# Patient Record
Sex: FEMALE | Race: OTHER | HISPANIC OR LATINO
[De-identification: names, ages, dates, MRNs, and addresses within clinical notes are randomized per-mention and may not be internally consistent; named-entity substitution may affect disease eponyms.]

---

## 2022-01-28 PROBLEM — Z00.00 ENCOUNTER FOR PREVENTIVE HEALTH EXAMINATION: Status: ACTIVE | Noted: 2022-01-28

## 2022-02-09 ENCOUNTER — LABORATORY RESULT (OUTPATIENT)
Age: 32
End: 2022-02-09

## 2022-02-09 ENCOUNTER — APPOINTMENT (OUTPATIENT)
Dept: GASTROENTEROLOGY | Facility: CLINIC | Age: 32
End: 2022-02-09
Payer: COMMERCIAL

## 2022-02-09 VITALS
TEMPERATURE: 97.7 F | BODY MASS INDEX: 29.99 KG/M2 | HEIGHT: 65 IN | HEART RATE: 89 BPM | DIASTOLIC BLOOD PRESSURE: 80 MMHG | WEIGHT: 180 LBS | SYSTOLIC BLOOD PRESSURE: 130 MMHG | OXYGEN SATURATION: 99 %

## 2022-02-09 DIAGNOSIS — Z82.49 FAMILY HISTORY OF ISCHEMIC HEART DISEASE AND OTHER DISEASES OF THE CIRCULATORY SYSTEM: ICD-10-CM

## 2022-02-09 DIAGNOSIS — Z78.9 OTHER SPECIFIED HEALTH STATUS: ICD-10-CM

## 2022-02-09 DIAGNOSIS — Z86.39 PERSONAL HISTORY OF OTHER ENDOCRINE, NUTRITIONAL AND METABOLIC DISEASE: ICD-10-CM

## 2022-02-09 PROCEDURE — 99204 OFFICE O/P NEW MOD 45 MIN: CPT

## 2022-02-09 RX ORDER — PANTOPRAZOLE 40 MG/1
40 TABLET, DELAYED RELEASE ORAL DAILY
Qty: 30 | Refills: 3 | Status: ACTIVE | COMMUNITY
Start: 2022-02-09 | End: 1900-01-01

## 2022-02-09 RX ORDER — ICOSAPENT ETHYL 1000 MG/1
1 CAPSULE ORAL
Refills: 0 | Status: ACTIVE | COMMUNITY

## 2022-02-09 NOTE — HISTORY OF PRESENT ILLNESS
[None] : had no significant interval events [Vomiting] : resolved vomiting [Diarrhea] : denies diarrhea [Constipation] : denies constipation [Yellow Skin Or Eyes (Jaundice)] : denies jaundice [Rectal Pain] : denies rectal pain [Heartburn] : heartburn [Nausea] : nausea [Abdominal Pain] : abdominal pain [Abdominal Swelling] : abdominal swelling [GERD] : gastroesophageal reflux disease [Wt Gain ___ Lbs] : no recent weight gain [Wt Loss ___ Lbs] : no recent weight loss [Hiatus Hernia] : no hiatus hernia [Peptic Ulcer Disease] : no peptic ulcer disease [Pancreatitis] : no pancreatitis [Cholelithiasis] : no cholelithiasis [Kidney Stone] : no kidney stone [Inflammatory Bowel Disease] : no inflammatory bowel disease [Irritable Bowel Syndrome] : no irritable bowel syndrome [Diverticulitis] : no diverticulitis [Alcohol Abuse] : no alcohol abuse [Malignancy] : no malignancy [Abdominal Surgery] : no abdominal surgery [Appendectomy] : no appendectomy [Cholecystectomy] : no cholecystectomy [de-identified] : The patient is a 32-year-old  with past medical history significant for hypercholesterolemia who was referred to my office by Dr. Elizabeth Reynaga for abdominal pain, dyspepsia, gastroesophageal reflux disease and occasional nausea/vomiting. The patient also admits to having liver issues on recent bloodwork. I was asked to render an opinion for consultation for the above complaints.   The patient states that she is feeling uncomfortable x 7 months.  The patient has a history of liver disease.  The patient denies any prior exposure to hepatitis A, B or C.  The patient denies any large doses of nonsteroidal anti-inflammatory drugs or acetaminophen.   The patient denies sharing needles, razors, nail clippers, nail files, scissors, et cetera.  The patient denies any EtOH abuse, cocaine use or intravenous drug use.   The patient denies any prior blood transfusions, sexual indiscretions, tattoos or piercing.  The patient admits to having prior surgery.  The history of surgery is significant for a prior .  The patient denies any family history of GI or liver problems. The patient complains of abdominal pain.  The patient describes the abdominal pain as a burning, intermittent midepigastric discomfort that is nonradiating in nature.  The abdominal pain is worse with stress.  The abdominal pain improves and is worse with meals and improves with passing gas or having a bowel movement.  The abdominal pain is described as mild in nature.  The abdominal pain occurs in the evening.  The abdominal pain can occur at any time.   The abdominal pain never has awakened the patient from sleep.  The abdominal pain is not relieved with any medications.  The abdominal pain is associated with abdominal gas and bloating. The patient complains of nausea and vomiting x 2 episodes that resolved spontaneously.  She currently denies any nausea or vomiting.  The patient complains of gastroesophageal reflux disease but denies any dysphagia. The gastroesophageal reflux disease is worse after meals and late at night and in the early morning.  The patient denies taking medications for the gastroesophageal reflux disease. The patient denies any atypical chest pain, shortness of breath or palpitations.  The patient denies any diaphoresis. The patient denies any constipation or diarrhea.  The patient has 2 to 3 bowel movements a day. The patient denies a change in bowel habits.  The patient denies a change in caliber of stool.  The patient denies having mucus discharge with the bowel movements.  The patient denies any bright red blood per rectum, melena or hematemesis.  The patient complains of rectal discomfort but denies any rectal pruritus. The patient denies any weight loss or anorexia.  She denies any fevers or chills.  The patient denies any jaundice or pruritus.  The patient complains of chronic lower back pain.  The patient denies ever having a prior upper endoscopy and colonoscopy performed by another gastroenterologist.  The patient's last menstrual period was on 2022. The patient's periods are regular at 28 to 30 days.  The patient's menstrual periods are heavy for 7 days.  The patient is a .  The patient's first menstrual period was at age 11. The patient denies any significant family history of GI problems.  [de-identified] : (-) smoking, (-) ETOH, (-) IVDA\par

## 2022-02-09 NOTE — ASSESSMENT
[FreeTextEntry1] : Abdominal Pain: The patient complains of abdominal pain. The patient is to avoid nonsteroidal anti-inflammatory drugs and aspirin. I recommend a trial of Pantoprazole 40 mg once a day for 3 months for the symptoms.  \par Dyspepsia: The patient complains of dyspeptic symptoms.  The patient was advised to abide by an anti-gas (low FOD-MAP) diet.  The patient was given a pamphlet for anti-gas (low FOD-MAP).  The patient and I reviewed the anti-gas (low FOD-MAP) diet at length. The patient is to start on a trial of Simethicone one tablet 4 times a day p.r.n. abdominal pain and gas.  I recommend an H. pylori breath test to assess for infection.\par GERD: The patient was advised to avoid late-night meals and dietary indiscretions.  The patient was advised to avoid fried and fatty foods.  The patient was advised to abide by an anti-GERD diet. The patient was given a pamphlet foranti-GERD.  The patient and I reviewed the anti-GERD diet at length. I recommend a trial of Pantoprazole 40 mg once a day x 3 months for the symptoms.\par R/O Fatty Liver: The patient was told of inflamed liver on prior blood test.  I recommend blood work and imaging studies to assess the liver.  I will try to obtain the results of the prior blood test from the patient. \par Blood Work: I recommend blood work to assess the liver. I recommend a CBC, SMA 24, amylase, lipase, ESR, TFTs, JOAO, rheumatoid factor, celiac sprue panel, IgA, profile for hepatitis A, B, C. ,AFP, alpha 1 anti-trypsin  antibody, ceruloplasmin level, iron, TIBC, ferritin level, AMA, anti smooth muscle antibody and PT/INR/PTT.  I also recommend obtaining the recent blood work performed by the patient's PMD.\par Imaging Study: I recommend an imaging study to assess the symptoms. I recommend an abdominal ultrasound to assess the liver parenchyma and for liver lesions.\par If the symptoms persist, the patient may require an upper endoscopy to assess for peptic ulcer disease versus esophagitis.  The patient was told of the risks and benefits of the procedure.  The patient was told of the risks of perforation, emergency surgery, bleeding, infections and missed lesions.  The patient agreed and will follow-up to reassess the symptoms.\par Follow-up: The patient is to follow-up in the office in 3 months to reassess the symptoms. The patient was told to call the office if any further problems. \par \par

## 2022-02-09 NOTE — REVIEW OF SYSTEMS
[Abdominal Pain] : abdominal pain [Heartburn] : heartburn [Itching] : itching [Negative] : Heme/Lymph [de-identified] : rama

## 2022-02-10 ENCOUNTER — NON-APPOINTMENT (OUTPATIENT)
Age: 32
End: 2022-02-10

## 2022-02-11 LAB
25(OH)D3 SERPL-MCNC: 17.5 NG/ML
A1AT SERPL-MCNC: 100 MG/DL
AFP-TM SERPL-MCNC: 6.6 NG/ML
ALBUMIN SERPL ELPH-MCNC: 4.8 G/DL
ALP BLD-CCNC: 88 U/L
ALT SERPL-CCNC: 20 U/L
AMYLASE/CREAT SERPL: 74 U/L
ANA SER IF-ACNC: NEGATIVE
ANION GAP SERPL CALC-SCNC: 17 MMOL/L
AST SERPL-CCNC: 16 U/L
BASOPHILS # BLD AUTO: 0.03 K/UL
BASOPHILS NFR BLD AUTO: 0.7 %
BILIRUB SERPL-MCNC: 0.2 MG/DL
BUN SERPL-MCNC: 7 MG/DL
CALCIUM SERPL-MCNC: 9.6 MG/DL
CERULOPLASMIN SERPL-MCNC: 22 MG/DL
CHLORIDE SERPL-SCNC: 102 MMOL/L
CHOLEST SERPL-MCNC: 243 MG/DL
CO2 SERPL-SCNC: 20 MMOL/L
CREAT SERPL-MCNC: 0.71 MG/DL
EOSINOPHIL # BLD AUTO: 0.07 K/UL
EOSINOPHIL NFR BLD AUTO: 1.7 %
ERYTHROCYTE [SEDIMENTATION RATE] IN BLOOD BY WESTERGREN METHOD: 13 MM/HR
ESTIMATED AVERAGE GLUCOSE: 111 MG/DL
FERRITIN SERPL-MCNC: 75 NG/ML
GGT SERPL-CCNC: 24 U/L
GLUCOSE SERPL-MCNC: 79 MG/DL
HBA1C MFR BLD HPLC: 5.5 %
HBV CORE IGG+IGM SER QL: NONREACTIVE
HBV CORE IGM SER QL: NONREACTIVE
HBV E AB SER QL: NONREACTIVE
HBV E AG SER QL: NONREACTIVE
HBV SURFACE AB SER QL: REACTIVE
HBV SURFACE AG SER QL: NONREACTIVE
HCT VFR BLD CALC: 43 %
HCV AB SER QL: NONREACTIVE
HCV S/CO RATIO: 0.08 S/CO
HDLC SERPL-MCNC: 41 MG/DL
HEPATITIS A IGG ANTIBODY: REACTIVE
HGB BLD-MCNC: 13.7 G/DL
IGA SER QL IEP: 149 MG/DL
IMM GRANULOCYTES NFR BLD AUTO: 0.5 %
IRON SATN MFR SERPL: 25 %
IRON SERPL-MCNC: 81 UG/DL
LDLC SERPL CALC-MCNC: NORMAL MG/DL
LPL SERPL-CCNC: 29 U/L
LYMPHOCYTES # BLD AUTO: 1.94 K/UL
LYMPHOCYTES NFR BLD AUTO: 47.3 %
MAN DIFF?: NORMAL
MCHC RBC-ENTMCNC: 31.1 PG
MCHC RBC-ENTMCNC: 31.9 GM/DL
MCV RBC AUTO: 97.7 FL
MITOCHONDRIA AB SER IF-ACNC: NORMAL
MONOCYTES # BLD AUTO: 0.31 K/UL
MONOCYTES NFR BLD AUTO: 7.6 %
NEUTROPHILS # BLD AUTO: 1.73 K/UL
NEUTROPHILS NFR BLD AUTO: 42.2 %
NONHDLC SERPL-MCNC: 202 MG/DL
PLATELET # BLD AUTO: 290 K/UL
POTASSIUM SERPL-SCNC: 4 MMOL/L
PROT SERPL-MCNC: 6.7 G/DL
RBC # BLD: 4.4 M/UL
RBC # FLD: 12.4 %
RHEUMATOID FACT SER QL: <10 IU/ML
SMOOTH MUSCLE AB SER QL IF: ABNORMAL
SODIUM SERPL-SCNC: 139 MMOL/L
TIBC SERPL-MCNC: 329 UG/DL
TRIGL SERPL-MCNC: 597 MG/DL
TSH SERPL-ACNC: 1.56 UIU/ML
TTG IGA SER IA-ACNC: <1.2 U/ML
TTG IGA SER-ACNC: NEGATIVE
TTG IGG SER IA-ACNC: 1.2 U/ML
TTG IGG SER IA-ACNC: NEGATIVE
UIBC SERPL-MCNC: 248 UG/DL
UREA BREATH TEST QL: POSITIVE
WBC # FLD AUTO: 4.1 K/UL

## 2022-02-25 ENCOUNTER — OUTPATIENT (OUTPATIENT)
Dept: OUTPATIENT SERVICES | Facility: HOSPITAL | Age: 32
LOS: 1 days | End: 2022-02-25
Payer: COMMERCIAL

## 2022-02-25 ENCOUNTER — APPOINTMENT (OUTPATIENT)
Dept: ULTRASOUND IMAGING | Facility: HOSPITAL | Age: 32
End: 2022-02-25
Payer: COMMERCIAL

## 2022-02-25 DIAGNOSIS — K76.0 FATTY (CHANGE OF) LIVER, NOT ELSEWHERE CLASSIFIED: ICD-10-CM

## 2022-02-25 PROCEDURE — 76700 US EXAM ABDOM COMPLETE: CPT

## 2022-02-25 PROCEDURE — 76700 US EXAM ABDOM COMPLETE: CPT | Mod: 26

## 2022-02-28 ENCOUNTER — NON-APPOINTMENT (OUTPATIENT)
Age: 32
End: 2022-02-28

## 2022-05-11 ENCOUNTER — APPOINTMENT (OUTPATIENT)
Dept: GASTROENTEROLOGY | Facility: CLINIC | Age: 32
End: 2022-05-11
Payer: COMMERCIAL

## 2022-05-11 VITALS
DIASTOLIC BLOOD PRESSURE: 77 MMHG | OXYGEN SATURATION: 100 % | TEMPERATURE: 96.8 F | HEIGHT: 65 IN | SYSTOLIC BLOOD PRESSURE: 130 MMHG | WEIGHT: 174 LBS | BODY MASS INDEX: 28.99 KG/M2 | HEART RATE: 82 BPM

## 2022-05-11 DIAGNOSIS — K21.9 GASTRO-ESOPHAGEAL REFLUX DISEASE W/OUT ESOPHAGITIS: ICD-10-CM

## 2022-05-11 PROCEDURE — 99214 OFFICE O/P EST MOD 30 MIN: CPT

## 2022-05-11 RX ORDER — SIMETHICONE 180 MG
180 CAPSULE ORAL 4 TIMES DAILY
Qty: 120 | Refills: 3 | Status: ACTIVE | COMMUNITY
Start: 2022-05-11 | End: 1900-01-01

## 2022-05-11 RX ORDER — PANTOPRAZOLE 40 MG/1
40 TABLET, DELAYED RELEASE ORAL DAILY
Qty: 30 | Refills: 3 | Status: ACTIVE | COMMUNITY
Start: 2022-05-11 | End: 1900-01-01

## 2022-05-11 RX ORDER — BIFIDOBACTERIUM LONGUM 10MM CELL
4 CAPSULE ORAL
Qty: 30 | Refills: 3 | Status: ACTIVE | COMMUNITY
Start: 2022-05-11 | End: 1900-01-01

## 2022-05-11 NOTE — HISTORY OF PRESENT ILLNESS
[None] : had no significant interval events [Nausea] : denies nausea [Vomiting] : denies vomiting [Diarrhea] : denies diarrhea [Yellow Skin Or Eyes (Jaundice)] : denies jaundice [Rectal Pain] : denies rectal pain [Wt Loss ___ Lbs] : recent [unfilled] ~Upound(s) weight loss [Heartburn] : heartburn [Constipation] : constipation [Abdominal Pain] : abdominal pain [Abdominal Swelling] : abdominal swelling [GERD] : gastroesophageal reflux disease [Wt Gain ___ Lbs] : no recent weight gain [Hiatus Hernia] : no hiatus hernia [Peptic Ulcer Disease] : no peptic ulcer disease [Pancreatitis] : no pancreatitis [Cholelithiasis] : no cholelithiasis [Kidney Stone] : no kidney stone [Inflammatory Bowel Disease] : no inflammatory bowel disease [Irritable Bowel Syndrome] : no irritable bowel syndrome [Diverticulitis] : no diverticulitis [Alcohol Abuse] : no alcohol abuse [Malignancy] : no malignancy [Abdominal Surgery] : no abdominal surgery [Appendectomy] : no appendectomy [Cholecystectomy] : no cholecystectomy [de-identified] : The patient has a history of liver disease. The patient denies any prior exposure to hepatitis A, B or C. The patient denies any large doses of nonsteroidal anti-inflammatory drugs or acetaminophen. The patient denies sharing needles, razors, nail clippers, nail files, scissors, et cetera. The patient denies any EtOH abuse, cocaine use or intravenous drug use. The patient denies any prior blood transfusions, sexual indiscretions, tattoos or piercing. The patient admits to having prior surgery. The history of surgery is significant for a prior . The patient denies any family history of GI or liver problems. The patient states that she is feeling better. The patient denies any jaundice or pruritus.  The patient denies any chronic lower back pain. The patient complains of abdominal pain.  The patient describes the abdominal pain as a crampy, burning, intermittent midepigastric discomfort that is nonradiating in nature.  The abdominal pain is worse with stress and improves with meals and passing gas or having a bowel movement.  The abdominal pain is described as mild in nature.  The abdominal pain occurs at night and in the morning.  The abdominal pain can occur at any time.   The abdominal pain has never awakened the patient from sleep.  The abdominal pain is relieved with certain medication such as proton pump inhibitors, H2 blockers and antacids.  The abdominal pain is associated with abdominal gas and bloating.  The patient denies any nausea or vomiting.  The patient complains of occasional gastroesophageal reflux disease but denies any dysphagia.  The gastroesophageal reflux disease is worse after meals and late at night and in the early morning. The gastroesophageal reflux disease is improved with proton pump inhibitors, H2 blockers and antacids.   The patient denies any atypical chest pain, shortness of breath or palpitations.  The patient denies any diaphoresis. The patient complains of occasional constipation but denies any diarrhea.  The patient has 1 bowel movement every 1 to 2 days. The patient complains of a change in bowel habits.  The patient complains of a change in caliber of stool. The patient denies having mucus discharge with the bowel movements.  The patient denies any bright red blood per rectum, melena or hematemesis.  The patient denies any rectal pain or rectal pruritus.  The patient complains of weight loss but denies any anorexia.  The patient admits to losing 10 pounds over the past 2 months. The patient attributes the weight loss to recent change in diet and exercise.  She denies any fevers or chills.  The abdominal ultrasound performed on 2022 revealed a prominent fatty liver. The blood test performed on February 10, 2022 revealed a normal hemoglobin A1c of 5.5% with an estimated average glucose of 111 mg/dL, a normal alpha-fetoprotein level of 6.6 ng/mL, a normal amylase/lipase level of 74/29 U/L, respectively, a normal GGTP of 24 U/L, no evidence of anemia with a hemoglobin/hematocrit level of 13.7/43.0, respectively, a normal serum iron level of 81 mcg/dL, a normal TIBC/U IBC of 329/248 mcg/dL, respectively, a normal iron percent saturation of 25%, a normal ferritin level of 75 ng/mL, and elevated total cholesterol/triglyceride level of 243/597 mg/dL, respectively, a low vitamin D level of 17.5 ng/mL, a normal TSH of 1.56 uIU/ml, normal rheumatoid factor of <10 a IU/ml, an elevated anti-smooth muscle antibody of 1:20, a negative anti-mitochondrial antibody, a low CO2 of 20 mmol/L, normal liver enzymes with a total bilirubin of 0.2 mg/dL, a normal alkaline phosphatase/AST/ALT/GGTP of 88/16/20/24 U/L, respectively, a negative transglutaminase IgG antibody EIA of 1.2 U/ml, a negative transglutaminase IgA antibody EIA of 1.2 U/ml, a negative Gliadin Deamidated IgG antibody of < 5.0 units, a negative Gliadin Deamidated IgA antibody of < 5.0 units, a normal serum IgA of 149 mg/dl, a reactive hepatitis B surface antibody, and reactive hepatitis A IgG antibody, and a normal ESR of 13 mm/h.  The patient denies any significant family history of GI problems.  [de-identified] : (-) smoking, (-) ETOH, (-) IVDA\par

## 2022-05-11 NOTE — ASSESSMENT
[FreeTextEntry1] : Abdominal Pain: The patient complains of abdominal pain. The patient is to avoid nonsteroidal anti-inflammatory drugs and aspirin. I recommend a trial of Pantoprazole 40 mg once a day for 3 months for the symptoms.  \par Dyspepsia: The patient complains of dyspeptic symptoms.  The patient was advised to continue to abide by an anti-gas (low FOD-MAP) diet.  The patient was previously given a pamphlet for anti-gas (low FOD-MAP).  The patient and I reviewed the anti-gas (low FOD-MAP)diet at length again. The patient is to continue on a trial of Simethicone one tablet 4 times a day p.r.n. abdominal pain and gas.\par GERD: The patient was advised to avoid late-night meals and dietary indiscretions.  The patient was advised to avoid fried and fatty foods.  The patient was advised to abide by an anti-GERD diet. The patient was given a pamphlet for anti-GERD.  The patient and I reviewed the anti-GERD diet at length. I recommend a trial of Pantoprazole 40 mg once a day x 3 months for the symptoms.\par Constipation: The patient complains of constipation. I recommend a high-fiber diet. I recommend a trial of a probiotic such as Align once a day. I recommend a trial of Metamucil once a day for fiber supplementation.   The patient agreed and will followup to reassess the symptoms.  \par Fatty Liver:  The patient had an imaging study suggestive of fatty infiltration of the liver.  The patient denies any jaundice or pruritus.  The patient denies any alcohol use.  The patient denies taking large doses of nonsteroidal anti-inflammatory drugs or acetaminophen.  The findings are suggestive of fatty liver.  The patient and I had a long discussion regarding the risks of fatty liver and possible progression to cirrhosis.  The patient was told of the possible increased risk of developing liver failure, cirrhosis, ascites, GI bleeding secondary to varices, hepatic encephalopathy, bleeding tendencies and liver cancer.  The patient was told of the importance of follow-up.  The patient was advised to follow up every 6 months for blood work and imaging studies. The patient agreed and will follow up. The patient was advised to lose weight and exercise.   The patient was advised to abide by a Mediterranean diet. I recommend a trial of vitamin E supplementation for the fatty liver.  If the liver enzymes remain elevated, the patient may require a trial of Pioglitazone for the fatty liver. I recommend avoid alcohol and hepato-toxic agents.  The patient was also advised to avoid NSAIDs, Acetaminophen and any other hepatotoxic drugs. The patient was also advised not to share needles, razors, scissors, nail clippers, etc.. The patient is to continue close follow-up in our office for blood work and exams.  If the liver enzymes remain elevated, the patient may require a CT guided liver biopsy to assess the liver parenchyma for possible treatment.  We had a long discussion regarding the risks and benefits of the procedure.  The patient was told of the risks of bleeding, perforation, infections, emergency surgery and missing lesions.  The patient agreed and will follow-up to reassess the symptoms.  \par Follow-up: The patient is to follow-up in the office in 6 months to reassess the symptoms. The patient was told to call the office if any further problems. \par \par

## 2022-11-14 ENCOUNTER — APPOINTMENT (OUTPATIENT)
Dept: GASTROENTEROLOGY | Facility: CLINIC | Age: 32
End: 2022-11-14

## 2022-11-14 VITALS
WEIGHT: 175 LBS | OXYGEN SATURATION: 100 % | BODY MASS INDEX: 29.12 KG/M2 | DIASTOLIC BLOOD PRESSURE: 79 MMHG | HEART RATE: 90 BPM | TEMPERATURE: 97.9 F | SYSTOLIC BLOOD PRESSURE: 129 MMHG

## 2022-11-14 DIAGNOSIS — K59.00 CONSTIPATION, UNSPECIFIED: ICD-10-CM

## 2022-11-14 PROCEDURE — 99214 OFFICE O/P EST MOD 30 MIN: CPT

## 2022-11-14 RX ORDER — DICYCLOMINE HYDROCHLORIDE 10 MG/1
10 CAPSULE ORAL 3 TIMES DAILY
Qty: 90 | Refills: 2 | Status: ACTIVE | COMMUNITY
Start: 2022-11-14 | End: 1900-01-01

## 2022-11-14 RX ORDER — BIFIDOBACTERIUM LONGUM 10MM CELL
4 CAPSULE ORAL
Qty: 30 | Refills: 3 | Status: ACTIVE | COMMUNITY
Start: 2022-11-14 | End: 1900-01-01

## 2022-11-14 RX ORDER — SIMETHICONE 180 MG
180 CAPSULE ORAL 4 TIMES DAILY
Qty: 120 | Refills: 3 | Status: ACTIVE | COMMUNITY
Start: 2022-11-14 | End: 1900-01-01

## 2022-11-14 NOTE — HISTORY OF PRESENT ILLNESS
[FreeTextEntry1] : The patient has a history of liver disease. The patient denies any prior exposure to hepatitis A, B or C. The patient denies any large doses of nonsteroidal anti-inflammatory drugs or acetaminophen. The patient denies sharing needles, razors, nail clippers, nail files, scissors, et cetera. The patient denies any EtOH abuse, cocaine use or intravenous drug use. The patient denies any prior blood transfusions, sexual indiscretions, tattoos or piercing. The patient admits to having prior surgery. The history of surgery is significant for a prior . The patient denies any family history of GI or liver problems. The patient states that she is feeling fine. The patient denies any jaundice or pruritus.  The patient denies any chronic lower back pain. The patient denies any abdominal pain.  The patient complains of abdominal gas and bloating.  The patient denies any nausea or vomiting.  The patient denies any gastroesophageal reflux disease or dysphagia.  The patient denies any atypical chest pain, shortness of breath or palpitations.  The patient denies any diaphoresis. The patient complains of constipation but denies any diarrhea.  The patient has 1 to 3 bowel movements a day. The patient complains of a change in bowel habits.  The patient complains of a change in caliber of stool. The patient denies having mucus discharge with the bowel movements.  The patient denies any bright red blood per rectum, melena or hematemesis.  The patient denies any rectal pain or rectal pruritus.  The patient currently denies any weight loss or anorexia.  The patient previously complained of weight loss but denied any anorexia. The patient admitted to losing 10 pounds over 2 months. The patient attributed the weight loss to change in diet and exercise. She denies any fevers or chills. The abdominal ultrasound performed on 2022 revealed a prominent fatty liver. The blood test performed on February 10, 2022 revealed a normal hemoglobin A1c of 5.5% with an estimated average glucose of 111 mg/dL, a normal alpha-fetoprotein level of 6.6 ng/mL, a normal amylase/lipase level of 74/29 U/L, respectively, a normal GGTP of 24 U/L, no evidence of anemia with a hemoglobin/hematocrit level of 13.7/43.0, respectively, a normal serum iron level of 81 mcg/dL, a normal TIBC/U IBC of 329/248 mcg/dL, respectively, a normal iron percent saturation of 25%, a normal ferritin level of 75 ng/mL, and elevated total cholesterol/triglyceride level of 243/597 mg/dL, respectively, a low vitamin D level of 17.5 ng/mL, a normal TSH of 1.56 uIU/ml, normal rheumatoid factor of <10 a IU/ml, an elevated anti-smooth muscle antibody of 1:20, a negative anti-mitochondrial antibody, a low CO2 of 20 mmol/L, normal liver enzymes with a total bilirubin of 0.2 mg/dL, a normal alkaline phosphatase/AST/ALT/GGTP of 88/16/20/24 U/L, respectively, a negative transglutaminase IgG antibody EIA of 1.2 U/ml, a negative transglutaminase IgA antibody EIA of 1.2 U/ml, a negative Gliadin Deamidated IgG antibody of < 5.0 units, a negative Gliadin Deamidated IgA antibody of < 5.0 units, a normal serum IgA of 149 mg/dl, a reactive hepatitis B surface antibody, and reactive hepatitis A IgG antibody, and a normal ESR of 13 mm/h. The patient denies any significant family history of GI problems. \par \par (-) smoking, (-) ETOH, (-) IVDA\par  [de-identified] : The abdominal ultrasound performed on February 25, 2022 revealed a prominent fatty liver.

## 2022-11-14 NOTE — ASSESSMENT
[FreeTextEntry1] : Dyspepsia: The patient complains of dyspeptic symptoms.  The patient was advised to continue to abide by an anti-gas (low FOD-MAP) diet.  The patient was previously given a pamphlet for anti-gas (low FOD-MAP).  The patient and I reviewed the anti-gas (low FOD-MAP)diet at length again. The patient is to continue on a trial of Simethicone one tablet 4 times a day p.r.n. abdominal pain and gas.    I also recommend a trial of Dicyclomine 10 mg tablet PO 3 times a day PRN for the abdominal pain.\par Constipation: The patient complains of constipation. I recommend a high-fiber diet. I recommend a trial of a probiotic such as Align once a day. I recommend a trial of Metamucil once a day for fiber supplementation.  The patient \par Fatty Liver: The patient had an imaging study suggestive of fatty infiltration of the liver. The patient denies any jaundice or pruritus. The patient denies any alcohol use. The patient denies taking large doses of nonsteroidal anti-inflammatory drugs or acetaminophen. The findings are suggestive of fatty liver. The patient and I had a long discussion regarding the risks of fatty liver and possible progression to cirrhosis. The patient was told of the possible increased risk of developing liver failure, cirrhosis, ascites, GI bleeding secondary to varices, hepatic encephalopathy, bleeding tendencies and liver cancer. The patient was told of the importance of follow-up. The patient was advised to follow up every 6 months for blood work and imaging studies. The patient agreed and will follow up. The patient was advised to lose weight and exercise. The patient was advised to abide by a Mediterranean diet. I recommend a trial of vitamin E supplementation for the fatty liver. If the liver enzymes remain elevated, the patient may require a trial of Pioglitazone for the fatty liver. I recommend avoid alcohol and hepato-toxic agents. The patient was also advised to avoid NSAIDs, Acetaminophen and any other hepatotoxic drugs. The patient was also advised not to share needles, razors, scissors, nail clippers, etc.. The patient is to continue close follow-up in our office for blood work and exams. If the liver enzymes remain elevated, the patient may require a CT guided liver biopsy to assess the liver parenchyma for possible treatment. We had a long discussion regarding the risks and benefits of the procedure. The patient was told of the risks of bleeding, perforation, infections, emergency surgery and missing lesions. The patient agreed and will follow-up to reassess the symptoms. \par Blood Work: I  recommend obtaining the recent blood work performed by the patient's PMD.\par Follow-up: The patient is to follow-up in the office in 6 months to reassess the symptoms. The patient was told to call the office if any further problems.\par \par

## 2023-05-17 ENCOUNTER — APPOINTMENT (OUTPATIENT)
Dept: GASTROENTEROLOGY | Facility: CLINIC | Age: 33
End: 2023-05-17
Payer: COMMERCIAL

## 2023-05-17 VITALS
HEART RATE: 90 BPM | TEMPERATURE: 97.6 F | SYSTOLIC BLOOD PRESSURE: 116 MMHG | DIASTOLIC BLOOD PRESSURE: 76 MMHG | BODY MASS INDEX: 31.45 KG/M2 | WEIGHT: 189 LBS | OXYGEN SATURATION: 98 %

## 2023-05-17 DIAGNOSIS — R10.13 EPIGASTRIC PAIN: ICD-10-CM

## 2023-05-17 DIAGNOSIS — R19.7 DIARRHEA, UNSPECIFIED: ICD-10-CM

## 2023-05-17 DIAGNOSIS — K76.0 FATTY (CHANGE OF) LIVER, NOT ELSEWHERE CLASSIFIED: ICD-10-CM

## 2023-05-17 DIAGNOSIS — R11.0 NAUSEA: ICD-10-CM

## 2023-05-17 PROCEDURE — 99214 OFFICE O/P EST MOD 30 MIN: CPT

## 2023-05-17 RX ORDER — BIFIDOBACTERIUM LONGUM 10MM CELL
4 CAPSULE ORAL
Qty: 30 | Refills: 3 | Status: ACTIVE | COMMUNITY
Start: 2023-05-17 | End: 1900-01-01

## 2023-05-17 RX ORDER — PANTOPRAZOLE 40 MG/1
40 TABLET, DELAYED RELEASE ORAL
Qty: 30 | Refills: 3 | Status: ACTIVE | COMMUNITY
Start: 2023-05-17 | End: 1900-01-01

## 2023-05-17 RX ORDER — SIMETHICONE 125 MG/1
125 TABLET, CHEWABLE ORAL
Qty: 120 | Refills: 3 | Status: ACTIVE | COMMUNITY
Start: 2023-05-17 | End: 1900-01-01

## 2023-05-17 NOTE — HISTORY OF PRESENT ILLNESS
[FreeTextEntry1] : The patient has a history of liver disease. The patient denies any prior exposure to hepatitis A, B or C. The patient denies any large doses of nonsteroidal anti-inflammatory drugs or acetaminophen. The patient denies sharing needles, razors, nail clippers, nail files, scissors, et cetera. The patient denies any EtOH abuse, cocaine use or intravenous drug use. The patient denies any prior blood transfusions, sexual indiscretions, tattoos or piercing. The patient admits to having prior surgery. The history of surgery is significant for a prior . The patient denies any family history of GI or liver problems. The patient states that she is feeling uncomfortable. The patient denies any jaundice or pruritus.  The patient denies any chronic lower back pain. The patient complains of abdominal pain.  The patient describes the abdominal pain as a crampy, burning, constant midepigastric discomfort that is nonradiating in nature.  The abdominal pain is unrelated to meals.  The abdominal pain is worse with stress and improves with passing gas or having a bowel movement.  The abdominal pain is described as mild to moderate in nature.  The abdominal pain occurs at night and in the morning.  The abdominal pain can occur at any time.   The abdominal pain has never awakened the patient from sleep.  The abdominal pain is not relieved with medication.  The abdominal pain is associated with abdominal gas and bloating.  The patient complains of nausea but denies any vomiting.  The patient denies any gastroesophageal reflux disease or dysphagia.  The patient denies any atypical chest pain, shortness of breath or palpitations.  The patient denies any diaphoresis. The patient complains of 1 episode of diarrhea but denies any constipation.  The patient has 1 to 3 bowel movements a day. The diarrhea is described as soft in nature.   The patient complains of a change in bowel habits.  The patient complains of a change in caliber of stool.  The patient denies having mucus discharge with the bowel movements.  The patient denies any bright red blood per rectum, melena or hematemesis.  The patient denies any rectal pain or rectal pruritus.  The patient denies any weight loss or anorexia.  The patient admits to gaining weight recently.  The patient previously complained of weight loss but denied any anorexia. The patient admitted to losing 10 pounds over 2 months. The patient attributed the weight loss to change in diet and exercise. She denies any fevers or chills. The abdominal ultrasound performed on 2022 revealed a prominent fatty liver. The blood test performed on February 10, 2022 revealed a normal hemoglobin A1c of 5.5% with an estimated average glucose of 111 mg/dL, a normal alpha-fetoprotein level of 6.6 ng/mL, a normal amylase/lipase level of 74/29 U/L, respectively, a normal GGTP of 24 U/L, no evidence of anemia with a hemoglobin/hematocrit level of 13.7/43.0, respectively, a normal serum iron level of 81 mcg/dL, a normal TIBC/U IBC of 329/248 mcg/dL, respectively, a normal iron percent saturation of 25%, a normal ferritin level of 75 ng/mL, and elevated total cholesterol/triglyceride level of 243/597 mg/dL, respectively, a low vitamin D level of 17.5 ng/mL, a normal TSH of 1.56 uIU/ml, normal rheumatoid factor of <10 a IU/ml, an elevated anti-smooth muscle antibody of 1:20, a negative anti-mitochondrial antibody, a low CO2 of 20 mmol/L, normal liver enzymes with a total bilirubin of 0.2 mg/dL, a normal alkaline phosphatase/AST/ALT/GGTP of 88/16/20/24 U/L, respectively, a negative transglutaminase IgG antibody EIA of 1.2 U/ml, a negative transglutaminase IgA antibody EIA of 1.2 U/ml, a negative Gliadin Deamidated IgG antibody of < 5.0 units, a negative Gliadin Deamidated IgA antibody of < 5.0 units, a normal serum IgA of 149 mg/dl, a reactive hepatitis B surface antibody, and reactive hepatitis A IgG antibody, and a normal ESR of 13 mm/h. The patient denies any significant family history of GI problems. \par \par (-) smoking, (-) ETOH, (-) IVDA\par  [de-identified] : The abdominal ultrasound performed on February 25, 2022 revealed a prominent fatty liver.

## 2023-05-17 NOTE — ASSESSMENT
[FreeTextEntry1] : Abdominal Pain: The patient complains of abdominal pain. The patient is to avoid nonsteroidal anti-inflammatory drugs and aspirin. I recommend a trial of Pantoprazole 40 mg once a day for 3 months for the symptoms.  \par Dyspepsia: The patient complains of dyspeptic symptoms.  The patient was advised to continue to abide by an anti-gas (low FOD-MAP) diet.  The patient was previously given a pamphlet for anti-gas (low FOD-MAP).  The patient and I reviewed the anti-gas (low FOD-MAP)diet at length again. The patient is to continue on a trial of Simethicone one tablet 4 times a day p.r.n. abdominal pain and gas.\par Nausea: The patient complains of nausea. If the symptoms of nausea persists, the patient may require a trial of Zofran 4 mg twice a day. \par Diarrhea: The patient complains of diarrhea.  I recommend a low residue diet. The patient is to avoid fiber supplementation. The patient is to consider starting a trial of a probiotic such as Align once a day.   If the symptoms recur, the patient may require  sending stool studies for C+S, O+P x3, and C. difficile to assess for an infectious etiology of the diarrhea.  The symptoms are worse after meals.   I recommend a trial of cholestyramine one packet once a day for possible bile induced diarrhea. If the symptoms persist, the patient may require a colonoscopy to assess for colitis versus other causes.  The patient was told of the risks and benefits of the procedure.  The patient was told of the risks of perforation, emergency surgery, bleeding, infections and missed lesions.  The patient agreed and will follow-up to reassess the symptoms.  \par Fatty Liver: The patient had an imaging study suggestive of fatty infiltration of the liver. The patient denies any jaundice or pruritus. The patient denies any alcohol use. The patient denies taking large doses of nonsteroidal anti-inflammatory drugs or acetaminophen. The findings are suggestive of fatty liver. The patient and I had a long discussion regarding the risks of fatty liver and possible progression to cirrhosis. The patient was told of the possible increased risk of developing liver failure, cirrhosis, ascites, GI bleeding secondary to varices, hepatic encephalopathy, bleeding tendencies and liver cancer. The patient was told of the importance of follow-up. The patient was advised to follow up every 6 months for blood work and imaging studies. The patient agreed and will follow up. The patient was advised to lose weight and exercise. The patient was advised to abide by a Mediterranean diet. I recommend a trial of vitamin E supplementation for the fatty liver. If the liver enzymes remain elevated, the patient may require a trial of Pioglitazone for the fatty liver. I recommend avoid alcohol and hepato-toxic agents. The patient was also advised to avoid NSAIDs, Acetaminophen and any other hepatotoxic drugs. The patient was also advised not to share needles, razors, scissors, nail clippers, etc.. The patient is to continue close follow-up in our office for blood work and exams. If the liver enzymes remain elevated, the patient may require a CT guided liver biopsy to assess the liver parenchyma for possible treatment. We had a long discussion regarding the risks and benefits of the procedure. The patient was told of the risks of bleeding, perforation, infections, emergency surgery and missing lesions. The patient agreed and will follow-up to reassess the symptoms. \par Blood Work: I recommend obtaining the prior blood work performed by the patient's PMD.\par Follow-up: The patient is to follow-up in the office in 6 months to reassess the symptoms. The patient was told to call the office if any further problems.\par \par \par

## 2024-03-04 ENCOUNTER — APPOINTMENT (OUTPATIENT)
Dept: GASTROENTEROLOGY | Facility: CLINIC | Age: 34
End: 2024-03-04